# Patient Record
Sex: MALE | Race: WHITE
[De-identification: names, ages, dates, MRNs, and addresses within clinical notes are randomized per-mention and may not be internally consistent; named-entity substitution may affect disease eponyms.]

---

## 2019-01-09 ENCOUNTER — HOSPITAL ENCOUNTER (EMERGENCY)
Dept: HOSPITAL 53 - M ED | Age: 54
Discharge: HOME | End: 2019-01-09
Payer: COMMERCIAL

## 2019-01-09 VITALS — SYSTOLIC BLOOD PRESSURE: 130 MMHG | DIASTOLIC BLOOD PRESSURE: 82 MMHG

## 2019-01-09 VITALS — BODY MASS INDEX: 32.34 KG/M2 | WEIGHT: 265.55 LBS | HEIGHT: 76 IN

## 2019-01-09 DIAGNOSIS — F17.210: ICD-10-CM

## 2019-01-09 DIAGNOSIS — Z79.82: ICD-10-CM

## 2019-01-09 DIAGNOSIS — R06.09: Primary | ICD-10-CM

## 2019-01-09 DIAGNOSIS — Z82.49: ICD-10-CM

## 2019-01-09 LAB
ALBUMIN SERPL BCG-MCNC: 3.7 GM/DL (ref 3.2–5.2)
ALT SERPL W P-5'-P-CCNC: 32 U/L (ref 12–78)
BASE EXCESS BLDV CALC-SCNC: 0.3 MMOL/L (ref -2–2)
BASOPHILS # BLD AUTO: 0 10^3/UL (ref 0–0.2)
BASOPHILS NFR BLD AUTO: 0.3 % (ref 0–1)
BILIRUB CONJ SERPL-MCNC: 0.2 MG/DL (ref 0–0.2)
BILIRUB SERPL-MCNC: 0.6 MG/DL (ref 0.2–1)
BUN SERPL-MCNC: 6 MG/DL (ref 7–18)
CALCIUM SERPL-MCNC: 8.7 MG/DL (ref 8.5–10.1)
CHLORIDE SERPL-SCNC: 109 MEQ/L (ref 98–107)
CK MB CFR.DF SERPL CALC: 1.49
CK MB CFR.DF SERPL CALC: 1.69
CK MB SERPL-MCNC: < 1 NG/ML (ref ?–3.6)
CK MB SERPL-MCNC: < 1 NG/ML (ref ?–3.6)
CK SERPL-CCNC: 59 U/L (ref 39–308)
CK SERPL-CCNC: 67 U/L (ref 39–308)
CO2 BLDV CALC-SCNC: 23.9 MEQ/L (ref 24–28)
CO2 SERPL-SCNC: 23 MEQ/L (ref 21–32)
CREAT SERPL-MCNC: 0.97 MG/DL (ref 0.7–1.3)
D DIMER PPP DDU-MCNC: < 270 NG/ML (ref ?–500)
EOSINOPHIL # BLD AUTO: 0.1 10^3/UL (ref 0–0.5)
EOSINOPHIL NFR BLD AUTO: 0.9 % (ref 0–3)
FLUAV RNA UPPER RESP QL NAA+PROBE: NEGATIVE
FLUBV RNA UPPER RESP QL NAA+PROBE: NEGATIVE
GFR SERPL CREATININE-BSD FRML MDRD: > 60 ML/MIN/{1.73_M2} (ref 56–?)
GLUCOSE SERPL-MCNC: 89 MG/DL (ref 70–100)
HCO3 BLDV-SCNC: 22.9 MEQ/L (ref 23–27)
HCO3 STD BLDV-SCNC: 24.8 MEQ/L
HCT VFR BLD AUTO: 48.1 % (ref 42–52)
HGB BLD-MCNC: 17.1 G/DL (ref 13.5–17.5)
INR PPP: 0.96
LYMPHOCYTES # BLD AUTO: 2.4 10^3/UL (ref 1.5–4.5)
LYMPHOCYTES NFR BLD AUTO: 31 % (ref 24–44)
MCH RBC QN AUTO: 31.1 PG (ref 27–33)
MCHC RBC AUTO-ENTMCNC: 35.6 G/DL (ref 32–36.5)
MCV RBC AUTO: 87.5 FL (ref 80–96)
MONOCYTES # BLD AUTO: 0.4 10^3/UL (ref 0–0.8)
MONOCYTES NFR BLD AUTO: 5 % (ref 0–5)
NEUTROPHILS # BLD AUTO: 4.7 10^3/UL (ref 1.8–7.7)
NEUTROPHILS NFR BLD AUTO: 62.3 % (ref 36–66)
NT-PRO BNP: 16 PG/ML (ref ?–125)
PCO2 BLDV: 32.1 MMHG (ref 38–50)
PH BLDV: 7.47 UNITS (ref 7.33–7.43)
PLATELET # BLD AUTO: 231 10^3/UL (ref 150–450)
PO2 BLDV: 81.6 MMHG (ref 30–50)
POTASSIUM SERPL-SCNC: 4 MEQ/L (ref 3.5–5.1)
PROT SERPL-MCNC: 6.5 GM/DL (ref 6.4–8.2)
PROTHROMBIN TIME: 12.9 SECONDS (ref 12.1–14.4)
RBC # BLD AUTO: 5.5 10^6/UL (ref 4.3–6.1)
SAO2 % BLDV: 97.1 % (ref 60–80)
SODIUM SERPL-SCNC: 138 MEQ/L (ref 136–145)
TROPONIN I SERPL-MCNC: < 0.02 NG/ML (ref ?–0.1)
TROPONIN I SERPL-MCNC: < 0.02 NG/ML (ref ?–0.1)
TSH SERPL DL<=0.005 MIU/L-ACNC: 0.96 UIU/ML (ref 0.36–3.74)
WBC # BLD AUTO: 7.6 10^3/UL (ref 4–10)

## 2019-01-09 NOTE — ECGEPIP
Stationary ECG Study

                           [facility] - ED

                                       

                                       Test Date:    2019

Pat Name:     ZAIN BELTRE             Department:   

Patient ID:   [id number]                 Room:         -

Gender:       M                        Technician:   TC

:          1965               Requested By: Anika Becerril 

Order Number: [id number]     Reading MD:   Ashish Jordan

                                 Measurements

Intervals                              Axis          

Rate:         67                       P:            61

AK:           152                      QRS:          38

QRSD:         108                      T:            71

QT:           377                                    

QTc:          398                                    

                           Interpretive Statements

SINUS RHYTHM WITH SINUS ARRHYTHMIA

NO PRIORS FOR COMPARISON

 

Electronically Signed On 2019 13:36:32 EST by Ashish Jordan

## 2019-01-09 NOTE — REP
Portable chest x-ray:

 

History:  Dyspnea.

 

Findings:  Right hemidiaphragm shows eventration.  The lungs are well inflated

and otherwise clear.  Pleural angles are sharp.  Cardiomediastinal silhouette is

unremarkable.  EKG monitoring electrodes are seen.  No bony abnormalities seen.

 

Impression:

 

No active disease.

 

 

Electronically Signed by

Toribio Somers MD 01/09/2019 10:33 A

## 2019-01-11 NOTE — ECGEPIP
Stationary ECG Study

                           [facility] - ED

                                       

                                       Test Date:    2019

Pat Name:     ZAIN BELTRE             Department:   

Patient ID:   [id number]                 Room:         -

Gender:       M                        Technician:   

:          1965               Requested By: Anika Becerril 

Order Number: [id number]     Reading MD:   Anika Becerril

                                 Measurements

Intervals                              Axis          

Rate:         61                       P:            52

WV:           159                      QRS:          31

QRSD:         108                      T:            66

QT:           402                                    

QTc:          405                                    

                           Interpretive Statements

SINUS RHYTHM

SIMILAR 19

Electronically Signed On 2019 13:49:24 EST by Anika Becerril